# Patient Record
Sex: FEMALE | Race: WHITE | NOT HISPANIC OR LATINO | URBAN - METROPOLITAN AREA
[De-identification: names, ages, dates, MRNs, and addresses within clinical notes are randomized per-mention and may not be internally consistent; named-entity substitution may affect disease eponyms.]

---

## 2019-07-05 ENCOUNTER — APPOINTMENT (RX ONLY)
Dept: URBAN - METROPOLITAN AREA CLINIC 37 | Facility: CLINIC | Age: 79
Setting detail: DERMATOLOGY
End: 2019-07-05

## 2019-07-05 ENCOUNTER — APPOINTMENT (RX ONLY)
Dept: URBAN - METROPOLITAN AREA CLINIC 38 | Facility: CLINIC | Age: 79
Setting detail: DERMATOLOGY
End: 2019-07-05

## 2019-07-05 DIAGNOSIS — D69.2 OTHER NONTHROMBOCYTOPENIC PURPURA: ICD-10-CM

## 2019-07-05 DIAGNOSIS — L82.1 OTHER SEBORRHEIC KERATOSIS: ICD-10-CM

## 2019-07-05 DIAGNOSIS — L82.0 INFLAMED SEBORRHEIC KERATOSIS: ICD-10-CM

## 2019-07-05 DIAGNOSIS — I83.9 ASYMPTOMATIC VARICOSE VEINS OF LOWER EXTREMITIES: ICD-10-CM

## 2019-07-05 DIAGNOSIS — L57.3 POIKILODERMA OF CIVATTE: ICD-10-CM

## 2019-07-05 DIAGNOSIS — S31000A OPEN WOUND(S) (MULTIPLE) OF UNSPECIFIED SITE(S), WITHOUT MENTION OF COMPLICATION: ICD-10-CM

## 2019-07-05 PROBLEM — M12.9 ARTHROPATHY, UNSPECIFIED: Status: ACTIVE | Noted: 2019-07-05

## 2019-07-05 PROBLEM — H91.90 UNSPECIFIED HEARING LOSS, UNSPECIFIED EAR: Status: ACTIVE | Noted: 2019-07-05

## 2019-07-05 PROBLEM — K21.9 GASTRO-ESOPHAGEAL REFLUX DISEASE WITHOUT ESOPHAGITIS: Status: ACTIVE | Noted: 2019-07-05

## 2019-07-05 PROBLEM — I83.93 ASYMPTOMATIC VARICOSE VEINS OF BILATERAL LOWER EXTREMITIES: Status: ACTIVE | Noted: 2019-07-05

## 2019-07-05 PROBLEM — I10 ESSENTIAL (PRIMARY) HYPERTENSION: Status: ACTIVE | Noted: 2019-07-05

## 2019-07-05 PROBLEM — S51.811A LACERATION WITHOUT FOREIGN BODY OF RIGHT FOREARM, INITIAL ENCOUNTER: Status: ACTIVE | Noted: 2019-07-05

## 2019-07-05 PROCEDURE — 17110 DESTRUCTION B9 LES UP TO 14: CPT

## 2019-07-05 PROCEDURE — ? COUNSELING

## 2019-07-05 PROCEDURE — ? LIQUID NITROGEN

## 2019-07-05 PROCEDURE — 99214 OFFICE O/P EST MOD 30 MIN: CPT | Mod: 25

## 2019-07-05 ASSESSMENT — LOCATION SIMPLE DESCRIPTION DERM
LOCATION SIMPLE: LEFT FOREARM
LOCATION SIMPLE: RIGHT FOREARM
LOCATION SIMPLE: LEFT ANTERIOR NECK
LOCATION SIMPLE: LEFT UPPER ARM
LOCATION SIMPLE: RIGHT CHEEK
LOCATION SIMPLE: RIGHT FOREARM
LOCATION SIMPLE: RIGHT UPPER ARM
LOCATION SIMPLE: RIGHT PRETIBIAL REGION
LOCATION SIMPLE: RIGHT PRETIBIAL REGION
LOCATION SIMPLE: LEFT PRETIBIAL REGION
LOCATION SIMPLE: RIGHT UPPER BACK
LOCATION SIMPLE: LEFT PRETIBIAL REGION
LOCATION SIMPLE: RIGHT CHEEK
LOCATION SIMPLE: RIGHT UPPER BACK
LOCATION SIMPLE: LEFT FOREARM
LOCATION SIMPLE: LEFT ANTERIOR NECK
LOCATION SIMPLE: LEFT UPPER ARM
LOCATION SIMPLE: RIGHT UPPER ARM

## 2019-07-05 ASSESSMENT — LOCATION DETAILED DESCRIPTION DERM
LOCATION DETAILED: LEFT ANTERIOR PROXIMAL UPPER ARM
LOCATION DETAILED: RIGHT LATERAL MALAR CHEEK
LOCATION DETAILED: RIGHT SUPERIOR UPPER BACK
LOCATION DETAILED: RIGHT SUPERIOR UPPER BACK
LOCATION DETAILED: LEFT INFERIOR ANTERIOR NECK
LOCATION DETAILED: LEFT PROXIMAL PRETIBIAL REGION
LOCATION DETAILED: RIGHT ANTERIOR PROXIMAL UPPER ARM
LOCATION DETAILED: LEFT INFERIOR ANTERIOR NECK
LOCATION DETAILED: LEFT DISTAL DORSAL FOREARM
LOCATION DETAILED: RIGHT DISTAL DORSAL FOREARM
LOCATION DETAILED: LEFT PROXIMAL PRETIBIAL REGION
LOCATION DETAILED: RIGHT DISTAL PRETIBIAL REGION
LOCATION DETAILED: RIGHT ANTERIOR PROXIMAL UPPER ARM
LOCATION DETAILED: LEFT DISTAL DORSAL FOREARM
LOCATION DETAILED: RIGHT DISTAL DORSAL FOREARM
LOCATION DETAILED: LEFT ANTERIOR PROXIMAL UPPER ARM
LOCATION DETAILED: RIGHT LATERAL MALAR CHEEK
LOCATION DETAILED: RIGHT DISTAL PRETIBIAL REGION

## 2019-07-05 ASSESSMENT — LOCATION ZONE DERM
LOCATION ZONE: ARM
LOCATION ZONE: LEG
LOCATION ZONE: TRUNK
LOCATION ZONE: NECK
LOCATION ZONE: FACE
LOCATION ZONE: LEG
LOCATION ZONE: ARM
LOCATION ZONE: NECK
LOCATION ZONE: TRUNK
LOCATION ZONE: FACE

## 2019-07-05 NOTE — PROCEDURE: MIPS QUALITY
Quality 47: Advance Care Plan: Advance Care Planning discussed and documented; advance care plan or surrogate decision maker documented in the medical record.
Name And Contact Information For Health Care Proxy: MARIELENA Roman Jr. (son)- [phone]\\Katiuska Roman (son)- [phone]
Quality 431: Preventive Care And Screening: Unhealthy Alcohol Use - Screening: Patient screened for unhealthy alcohol use using a single question and scores less than 2 times per year
Quality 226: Preventive Care And Screening: Tobacco Use: Screening And Cessation Intervention: Patient screened for tobacco use and is an ex/non-smoker
Detail Level: Generalized
Quality 130: Documentation Of Current Medications In The Medical Record: Current Medications Documented
Quality 111:Pneumonia Vaccination Status For Older Adults: Pneumococcal Vaccination Previously Received

## 2019-07-05 NOTE — HPI: EVALUATION OF SKIN LESION(S)
What Type Of Note Output Would You Prefer (Optional)?: Standard Output
Hpi Title: Evaluation of Skin Lesions
How Severe Are Your Spot(S)?: mild
Have Your Spot(S) Been Treated In The Past?: has not been treated
Family Member: Mother, Brother
Additional History: Patient presents today for FBSE. She has a spot on her right shoulder that is new and has been bleeding.

## 2019-07-05 NOTE — HPI: EVALUATION OF SKIN LESION(S)
What Type Of Note Output Would You Prefer (Optional)?: Standard Output
How Severe Are Your Spot(S)?: mild
Have Your Spot(S) Been Treated In The Past?: has not been treated
Hpi Title: Evaluation of Skin Lesions
Additional History: Patient presents today for FBSE. She has a spot on her right shoulder that is new and has been bleeding.
Family Member: Mother, Brother

## 2019-07-05 NOTE — PROCEDURE: MIPS QUALITY
Quality 431: Preventive Care And Screening: Unhealthy Alcohol Use - Screening: Patient screened for unhealthy alcohol use using a single question and scores less than 2 times per year
Quality 111:Pneumonia Vaccination Status For Older Adults: Pneumococcal Vaccination Previously Received
Quality 226: Preventive Care And Screening: Tobacco Use: Screening And Cessation Intervention: Patient screened for tobacco use and is an ex/non-smoker
Quality 130: Documentation Of Current Medications In The Medical Record: Current Medications Documented
Name And Contact Information For Health Care Proxy: ZIGGY Palencia Jr. (son)- [phone]\\Eliu Palencia (son)- [phone]
Detail Level: Generalized
Quality 47: Advance Care Plan: Advance Care Planning discussed and documented; advance care plan or surrogate decision maker documented in the medical record.

## 2019-07-05 NOTE — PROCEDURE: LIQUID NITROGEN
Post-Care Instructions: I reviewed with the patient in detail post-care instructions. Patient is to wear sunprotection, and avoid picking at any of the treated lesions. Pt may apply Vaseline to crusted or scabbing areas.
Include Z78.9 (Other Specified Conditions Influencing Health Status) As An Associated Diagnosis?: No
Render Post-Care Instructions In Note?: yes
Number Of Freeze-Thaw Cycles: 3 freeze-thaw cycles
Medical Necessity Clause: This procedure was medically necessary because the lesions that were treated were:
Medical Necessity Information: It is in your best interest to select a reason for this procedure from the list below. All of these items fulfill various CMS LCD requirements except the new and changing color options.
Detail Level: Detailed
Consent: The patient's consent was obtained including but not limited to risks of crusting, scabbing, blistering, scarring, darker or lighter pigmentary change, recurrence, incomplete removal and infection.

## 2019-07-05 NOTE — PROCEDURE: LIQUID NITROGEN
Consent: The patient's consent was obtained including but not limited to risks of crusting, scabbing, blistering, scarring, darker or lighter pigmentary change, recurrence, incomplete removal and infection.
Add 52 Modifier (Optional): no
Render Post-Care Instructions In Note?: yes
Post-Care Instructions: I reviewed with the patient in detail post-care instructions. Patient is to wear sunprotection, and avoid picking at any of the treated lesions. Pt may apply Vaseline to crusted or scabbing areas.
Number Of Freeze-Thaw Cycles: 3 freeze-thaw cycles
Medical Necessity Clause: This procedure was medically necessary because the lesions that were treated were:
Detail Level: Detailed
Medical Necessity Information: It is in your best interest to select a reason for this procedure from the list below. All of these items fulfill various CMS LCD requirements except the new and changing color options.

## 2021-07-13 ENCOUNTER — WEB ENCOUNTER (OUTPATIENT)
Dept: URBAN - NONMETROPOLITAN AREA CLINIC 2 | Facility: CLINIC | Age: 81
End: 2021-07-13

## 2021-07-13 ENCOUNTER — OFFICE VISIT (OUTPATIENT)
Dept: URBAN - NONMETROPOLITAN AREA CLINIC 2 | Facility: CLINIC | Age: 81
End: 2021-07-13
Payer: MEDICARE

## 2021-07-13 VITALS
HEART RATE: 75 BPM | DIASTOLIC BLOOD PRESSURE: 82 MMHG | HEIGHT: 64 IN | WEIGHT: 150 LBS | SYSTOLIC BLOOD PRESSURE: 166 MMHG | BODY MASS INDEX: 25.61 KG/M2

## 2021-07-13 DIAGNOSIS — K21.9 GASTROESOPHAGEAL REFLUX DISEASE, UNSPECIFIED WHETHER ESOPHAGITIS PRESENT: ICD-10-CM

## 2021-07-13 DIAGNOSIS — K57.50 DIVERTICULOSIS OF BOTH SMALL AND LARGE INTESTINE: ICD-10-CM

## 2021-07-13 DIAGNOSIS — R19.4 CHANGE IN BOWEL HABITS: ICD-10-CM

## 2021-07-13 DIAGNOSIS — Z80.0 FAMILY HISTORY OF COLON CANCER: ICD-10-CM

## 2021-07-13 PROCEDURE — 99204 OFFICE O/P NEW MOD 45 MIN: CPT | Performed by: INTERNAL MEDICINE

## 2021-07-13 RX ORDER — ALPRAZOLAM 0.5 MG/1
TABLET ORAL
Qty: 30 | Status: ACTIVE | COMMUNITY

## 2021-07-13 RX ORDER — LISINOPRIL AND HYDROCHLOROTHIAZIDE 12.5; 2 MG/1; MG/1
TAKE 1 TABLET BY MOUTH DAILY TABLET ORAL
Qty: 90 | Refills: 1 | Status: ACTIVE | COMMUNITY

## 2021-07-13 RX ORDER — DULOXETINE 30 MG/1
TAKE 1 CAPSULE EVERY DAY X 2 WEEKS CAPSULE, DELAYED RELEASE ORAL
Qty: 15 | Refills: 0 | Status: ACTIVE | COMMUNITY

## 2021-07-13 RX ORDER — POLYETHYLENE GLYCOL 3350, SODIUM SULFATE, SODIUM CHLORIDE, POTASSIUM CHLORIDE, ASCORBIC ACID, SODIUM ASCORBATE 140-9-5.2G
AS DIRECTED KIT ORAL AS DIRECTED
Qty: 280 GRAM | Refills: 0 | OUTPATIENT
Start: 2021-07-13 | End: 2021-07-14

## 2021-07-13 RX ORDER — AMLODIPINE BESYLATE 5 MG/1
TAKE 1 TABLET BY MOUTH EVERY EVENING FOR HIGH BLOOD PRESSURE TABLET ORAL
Qty: 90 | Refills: 3 | Status: ACTIVE | COMMUNITY

## 2021-07-13 NOTE — HPI-TODAY'S VISIT:
7/13/2021: Initial Gastroenterology Clinic Visit 80 year old female with past medical history of diverticulosis, GERD, hypertension, who presnts for evaluation of loose bowel movements.  Ms. Salinas notes that starting in October 2020, she experienced severe diarrhea over a three week period. In order to treat the diarrhea, she required the use of a half tablet of loperamide per day. Sinec October, she has noted slow improvement of her bowel movements. She has been able to wean off of loperamide completely. She denied weight loss of blood in the stool. She denied sick contacts prior to the episode of diarrhea. She denied changes in medication or antibiotic use prior to the diarrhea.   She had undergone colonoscopy throuhgout her life. Her most recent colonoscopy was at the age of 75. She has had one polyp throughout her life.   Her father had colon cancer at the age of 69.   Denies family history of inflammatory bowel disease.  Ms. Salinas is retired but previously worked doing work for the [employer].

## 2021-07-21 ENCOUNTER — TELEPHONE ENCOUNTER (OUTPATIENT)
Dept: URBAN - METROPOLITAN AREA CLINIC 23 | Facility: CLINIC | Age: 81
End: 2021-07-21

## 2021-07-24 PROBLEM — 129851009: Status: ACTIVE | Noted: 2021-07-13

## 2021-09-02 ENCOUNTER — TELEPHONE ENCOUNTER (OUTPATIENT)
Dept: URBAN - NONMETROPOLITAN AREA CLINIC 2 | Facility: CLINIC | Age: 81
End: 2021-09-02

## 2021-09-02 ENCOUNTER — CLAIMS CREATED FROM THE CLAIM WINDOW (OUTPATIENT)
Dept: URBAN - METROPOLITAN AREA CLINIC 4 | Facility: CLINIC | Age: 81
End: 2021-09-02
Payer: MEDICARE

## 2021-09-02 ENCOUNTER — OFFICE VISIT (OUTPATIENT)
Dept: URBAN - NONMETROPOLITAN AREA SURGERY CENTER 1 | Facility: SURGERY CENTER | Age: 81
End: 2021-09-02
Payer: MEDICARE

## 2021-09-02 DIAGNOSIS — K52.831 COLLAGENOUS COLITIS: ICD-10-CM

## 2021-09-02 DIAGNOSIS — R19.7 ACUTE DIARRHEA: ICD-10-CM

## 2021-09-02 DIAGNOSIS — K52.831 CC (COLLAGENOUS COLITIS): ICD-10-CM

## 2021-09-02 DIAGNOSIS — K62.1 ANAL AND RECTAL POLYP: ICD-10-CM

## 2021-09-02 DIAGNOSIS — K63.5 BENIGN COLON POLYP: ICD-10-CM

## 2021-09-02 DIAGNOSIS — K63.89 POLYP, HYPERPLASTIC: ICD-10-CM

## 2021-09-02 PROCEDURE — 45380 COLONOSCOPY AND BIOPSY: CPT | Performed by: INTERNAL MEDICINE

## 2021-09-02 PROCEDURE — G8907 PT DOC NO EVENTS ON DISCHARG: HCPCS | Performed by: INTERNAL MEDICINE

## 2021-09-02 PROCEDURE — 45385 COLONOSCOPY W/LESION REMOVAL: CPT | Performed by: INTERNAL MEDICINE

## 2021-09-02 PROCEDURE — 88342 IMHCHEM/IMCYTCHM 1ST ANTB: CPT | Performed by: PATHOLOGY

## 2021-09-02 PROCEDURE — 88313 SPECIAL STAINS GROUP 2: CPT | Performed by: PATHOLOGY

## 2021-09-02 PROCEDURE — 88305 TISSUE EXAM BY PATHOLOGIST: CPT | Performed by: PATHOLOGY

## 2021-09-02 RX ORDER — AMLODIPINE BESYLATE 5 MG/1
TAKE 1 TABLET BY MOUTH EVERY EVENING FOR HIGH BLOOD PRESSURE TABLET ORAL
Qty: 90 | Refills: 3 | Status: ACTIVE | COMMUNITY

## 2021-09-02 RX ORDER — DULOXETINE 30 MG/1
TAKE 1 CAPSULE EVERY DAY X 2 WEEKS CAPSULE, DELAYED RELEASE ORAL
Qty: 15 | Refills: 0 | Status: ACTIVE | COMMUNITY

## 2021-09-02 RX ORDER — LISINOPRIL AND HYDROCHLOROTHIAZIDE 12.5; 2 MG/1; MG/1
TAKE 1 TABLET BY MOUTH DAILY TABLET ORAL
Qty: 90 | Refills: 1 | Status: ACTIVE | COMMUNITY

## 2021-09-02 RX ORDER — ALPRAZOLAM 0.5 MG/1
TABLET ORAL
Qty: 30 | Status: ACTIVE | COMMUNITY

## 2021-09-25 ENCOUNTER — TELEPHONE ENCOUNTER (OUTPATIENT)
Dept: URBAN - NONMETROPOLITAN AREA CLINIC 2 | Facility: CLINIC | Age: 81
End: 2021-09-25

## 2021-09-25 RX ORDER — ALPRAZOLAM 0.5 MG/1
TABLET ORAL
Qty: 30 | Status: ACTIVE | COMMUNITY

## 2021-09-25 RX ORDER — DULOXETINE 30 MG/1
TAKE 1 CAPSULE EVERY DAY X 2 WEEKS CAPSULE, DELAYED RELEASE ORAL
Qty: 15 | Refills: 0 | Status: ACTIVE | COMMUNITY

## 2021-09-25 RX ORDER — BUDESONIDE 3 MG/1
3 CAPSULES CAPSULE, COATED PELLETS ORAL ONCE A DAY
Qty: 270 CAPSULE | Refills: 0 | OUTPATIENT
Start: 2021-10-04

## 2021-09-25 RX ORDER — LISINOPRIL AND HYDROCHLOROTHIAZIDE 12.5; 2 MG/1; MG/1
TAKE 1 TABLET BY MOUTH DAILY TABLET ORAL
Qty: 90 | Refills: 1 | Status: ACTIVE | COMMUNITY

## 2021-09-25 RX ORDER — AMLODIPINE BESYLATE 5 MG/1
TAKE 1 TABLET BY MOUTH EVERY EVENING FOR HIGH BLOOD PRESSURE TABLET ORAL
Qty: 90 | Refills: 3 | Status: ACTIVE | COMMUNITY

## 2021-10-20 ENCOUNTER — DASHBOARD ENCOUNTERS (OUTPATIENT)
Age: 81
End: 2021-10-20

## 2021-10-20 ENCOUNTER — TELEPHONE ENCOUNTER (OUTPATIENT)
Dept: URBAN - NONMETROPOLITAN AREA CLINIC 2 | Facility: CLINIC | Age: 81
End: 2021-10-20

## 2021-10-20 ENCOUNTER — OFFICE VISIT (OUTPATIENT)
Dept: URBAN - NONMETROPOLITAN AREA CLINIC 13 | Facility: CLINIC | Age: 81
End: 2021-10-20
Payer: MEDICARE

## 2021-10-20 DIAGNOSIS — Z80.0 FAMILY HISTORY OF COLON CANCER: ICD-10-CM

## 2021-10-20 DIAGNOSIS — K52.831 COLLAGENOUS COLITIS: ICD-10-CM

## 2021-10-20 DIAGNOSIS — K57.30 ACQUIRED DIVERTICULOSIS OF COLON: ICD-10-CM

## 2021-10-20 DIAGNOSIS — K21.9 GASTROESOPHAGEAL REFLUX DISEASE, UNSPECIFIED WHETHER ESOPHAGITIS PRESENT: ICD-10-CM

## 2021-10-20 DIAGNOSIS — Z12.11 COLON CANCER SCREENING: ICD-10-CM

## 2021-10-20 PROBLEM — 312824007: Status: ACTIVE | Noted: 2021-07-24

## 2021-10-20 PROBLEM — 235595009: Status: ACTIVE | Noted: 2021-07-24

## 2021-10-20 PROBLEM — 305058001: Status: ACTIVE | Noted: 2021-10-20

## 2021-10-20 PROBLEM — 19311003: Status: ACTIVE | Noted: 2021-10-04

## 2021-10-20 PROBLEM — 397881000: Status: ACTIVE | Noted: 2021-07-24

## 2021-10-20 PROCEDURE — 99213 OFFICE O/P EST LOW 20 MIN: CPT | Performed by: INTERNAL MEDICINE

## 2021-10-20 RX ORDER — DULOXETINE 30 MG/1
TAKE 1 CAPSULE EVERY DAY X 2 WEEKS CAPSULE, DELAYED RELEASE ORAL
Qty: 15 | Refills: 0 | Status: ACTIVE | COMMUNITY

## 2021-10-20 RX ORDER — BUDESONIDE 3 MG/1
3 CAPSULES CAPSULE, COATED PELLETS ORAL ONCE A DAY
Qty: 270 CAPSULE | Refills: 0 | Status: ACTIVE | COMMUNITY
Start: 2021-10-04

## 2021-10-20 RX ORDER — AMLODIPINE BESYLATE 5 MG/1
TAKE 1 TABLET BY MOUTH EVERY EVENING FOR HIGH BLOOD PRESSURE TABLET ORAL
Qty: 90 | Refills: 3 | Status: ACTIVE | COMMUNITY

## 2021-10-20 RX ORDER — LISINOPRIL AND HYDROCHLOROTHIAZIDE 12.5; 2 MG/1; MG/1
TAKE 1 TABLET BY MOUTH DAILY TABLET ORAL
Qty: 90 | Refills: 1 | Status: ACTIVE | COMMUNITY

## 2021-10-20 RX ORDER — ALPRAZOLAM 0.5 MG/1
TABLET ORAL
Qty: 30 | Status: ACTIVE | COMMUNITY

## 2021-10-20 NOTE — HPI-TODAY'S VISIT:
7/13/2021: Initial Gastroenterology Clinic Visit    80 year old female with past medical history of diverticulosis, GERD, hypertension, who presents for evaluation of loose bowel movements.  Ms. Salinas notes that starting in October 2020, she experienced severe diarrhea over a three week period. In order to treat the diarrhea, she required the use of a half tablet of loperamide per day. Since October, she has noted slow improvement of her bowel movements. She has been able to wean off of loperamide completely. She denied weight loss or blood in the stool. She denied sick contacts prior to the episode of diarrhea. She denied changes in medication or antibiotic use prior to the diarrhea.   She had undergone colonoscopy throuhgout her life. Her most recent colonoscopy was at the age of 75. She has had one polyp throughout her life.   Her father had colon cancer at the age of 69.   Denies family history of inflammatory bowel disease.  Ms. Salinas is retired but previously worked doing work for the [employer].  9/3/2021: Colonoscopy  The perianal and digital rectal examinations were normal. The terminal ileum was normal. Multiple small and large mouthed diverticula were found in the entire colon with most prominent in the descending colon/sigmoid colon. 2 mm polyp in the sigmoid colon. Polypectomy performed. Two sessile polyps were found in the rectum ranging from 2 to 5 mm. Polypectomy performed. Normal mucosa was otherwise found in the entire colon. Biopsied. Hemorrhoids found on retroflexion. 9/3/2021: Pathology from Colonoscopy  (A) Colon, Random Biopsy: COLLAGENOUS COLITIS. Negative for Infectious Organisms, Dysplasia or Malignancy. (B) Colon, Sigmoid, Polypectomy: HYPERPLASTIC POLYP(S). (C) Rectum, Polypectomy: HYPERPLASTIC POLYP(S).  10/20/2021: Gastroenterology Follow-Up Visit  Ms. Salinas has been unable to obtain the budesonide due to insurance. A prior authorization has been filled out for the budesonide. She was seen by her PCP and has been weaned off of ibuprofen, meloxicam. She has decreased her omeprazole. With medication changes, she notes that her bowel movements have improved. She is having one bowel movement per day.

## 2021-10-22 ENCOUNTER — TELEPHONE ENCOUNTER (OUTPATIENT)
Dept: URBAN - NONMETROPOLITAN AREA CLINIC 2 | Facility: CLINIC | Age: 81
End: 2021-10-22

## 2022-01-21 ENCOUNTER — OFFICE VISIT (OUTPATIENT)
Dept: URBAN - NONMETROPOLITAN AREA CLINIC 2 | Facility: CLINIC | Age: 82
End: 2022-01-21